# Patient Record
Sex: MALE | Race: WHITE | Employment: OTHER | URBAN - METROPOLITAN AREA
[De-identification: names, ages, dates, MRNs, and addresses within clinical notes are randomized per-mention and may not be internally consistent; named-entity substitution may affect disease eponyms.]

---

## 2017-04-04 PROBLEM — L03.011 PARONYCHIA, RIGHT: Status: ACTIVE | Noted: 2017-04-04

## 2017-09-26 PROBLEM — I45.10 INCOMPLETE RBBB: Status: ACTIVE | Noted: 2017-09-26

## 2017-09-26 PROCEDURE — 82607 VITAMIN B-12: CPT | Performed by: INTERNAL MEDICINE

## 2017-09-26 PROCEDURE — 82746 ASSAY OF FOLIC ACID SERUM: CPT | Performed by: INTERNAL MEDICINE

## 2017-10-03 PROBLEM — H40.1131 PRIMARY OPEN ANGLE GLAUCOMA OF BOTH EYES, MILD STAGE: Status: ACTIVE | Noted: 2017-10-03

## 2017-10-23 PROCEDURE — 82043 UR ALBUMIN QUANTITATIVE: CPT | Performed by: INTERNAL MEDICINE

## 2017-10-23 PROCEDURE — 82570 ASSAY OF URINE CREATININE: CPT | Performed by: INTERNAL MEDICINE

## 2017-10-23 PROCEDURE — 81003 URINALYSIS AUTO W/O SCOPE: CPT | Performed by: INTERNAL MEDICINE

## 2017-10-30 PROBLEM — L03.011 PARONYCHIA, RIGHT: Status: RESOLVED | Noted: 2017-04-04 | Resolved: 2017-10-30

## 2017-10-30 PROBLEM — K76.0 HEPATIC STEATOSIS: Status: ACTIVE | Noted: 2017-10-30

## 2017-10-30 PROBLEM — R35.0 BENIGN PROSTATIC HYPERPLASIA WITH URINARY FREQUENCY: Status: ACTIVE | Noted: 2017-10-30

## 2017-10-30 PROBLEM — N40.1 BENIGN PROSTATIC HYPERPLASIA WITH URINARY FREQUENCY: Status: ACTIVE | Noted: 2017-10-30

## 2018-01-06 PROBLEM — T14.8XXA WOUND DRAINAGE: Status: ACTIVE | Noted: 2018-01-06

## 2018-01-06 PROBLEM — L24.A9 WOUND DRAINAGE: Status: ACTIVE | Noted: 2018-01-06

## 2018-01-06 PROBLEM — Z98.890 S/P SHOULDER SURGERY: Status: ACTIVE | Noted: 2018-01-06

## 2018-05-02 PROBLEM — G89.4 CHRONIC PAIN SYNDROME: Status: ACTIVE | Noted: 2018-05-02

## 2018-06-20 PROBLEM — L24.A9 WOUND DRAINAGE: Status: RESOLVED | Noted: 2018-01-06 | Resolved: 2018-06-20

## 2018-06-20 PROBLEM — Z79.4 TYPE 2 DIABETES MELLITUS WITH HYPERGLYCEMIA, WITH LONG-TERM CURRENT USE OF INSULIN (HCC): Status: ACTIVE | Noted: 2018-06-20

## 2018-06-20 PROBLEM — E11.65 TYPE 2 DIABETES MELLITUS WITH HYPERGLYCEMIA, WITH LONG-TERM CURRENT USE OF INSULIN (HCC): Status: ACTIVE | Noted: 2018-06-20

## 2018-06-20 PROBLEM — T14.8XXA WOUND DRAINAGE: Status: RESOLVED | Noted: 2018-01-06 | Resolved: 2018-06-20

## 2018-06-20 PROBLEM — R74.8 ELEVATED LIVER ENZYMES: Status: ACTIVE | Noted: 2018-06-20

## 2018-06-20 PROBLEM — E66.01 MORBID OBESITY (HCC): Status: ACTIVE | Noted: 2018-06-20

## 2018-06-22 PROCEDURE — 36415 COLL VENOUS BLD VENIPUNCTURE: CPT | Performed by: INTERNAL MEDICINE

## 2018-06-22 PROCEDURE — 80074 ACUTE HEPATITIS PANEL: CPT | Performed by: INTERNAL MEDICINE

## 2018-07-25 PROBLEM — M54.50 CHRONIC MIDLINE LOW BACK PAIN WITHOUT SCIATICA: Status: ACTIVE | Noted: 2018-07-25

## 2018-07-25 PROBLEM — G89.29 CHRONIC MIDLINE LOW BACK PAIN WITHOUT SCIATICA: Status: ACTIVE | Noted: 2018-07-25

## 2018-08-21 PROBLEM — M75.101 ROTATOR CUFF SYNDROME OF BOTH SHOULDERS: Status: ACTIVE | Noted: 2018-08-21

## 2018-08-21 PROBLEM — M75.102 ROTATOR CUFF SYNDROME OF BOTH SHOULDERS: Status: ACTIVE | Noted: 2018-08-21

## 2018-09-06 PROBLEM — M75.121 COMPLETE TEAR OF RIGHT ROTATOR CUFF: Status: ACTIVE | Noted: 2018-09-06

## 2018-10-02 PROCEDURE — 82043 UR ALBUMIN QUANTITATIVE: CPT | Performed by: INTERNAL MEDICINE

## 2018-10-02 PROCEDURE — 82570 ASSAY OF URINE CREATININE: CPT | Performed by: INTERNAL MEDICINE

## 2018-10-23 ENCOUNTER — APPOINTMENT (OUTPATIENT)
Dept: LAB | Age: 58
End: 2018-10-23
Attending: ORTHOPAEDIC SURGERY
Payer: COMMERCIAL

## 2018-10-23 DIAGNOSIS — Z98.890 S/P ROTATOR CUFF REPAIR: ICD-10-CM

## 2018-10-23 PROCEDURE — 87205 SMEAR GRAM STAIN: CPT

## 2018-10-23 PROCEDURE — 87075 CULTR BACTERIA EXCEPT BLOOD: CPT

## 2018-10-23 PROCEDURE — 87070 CULTURE OTHR SPECIMN AEROBIC: CPT

## 2019-02-25 PROCEDURE — 82085 ASSAY OF ALDOLASE: CPT | Performed by: INTERNAL MEDICINE

## 2019-06-14 PROCEDURE — 83516 IMMUNOASSAY NONANTIBODY: CPT | Performed by: OTHER

## 2019-06-14 PROCEDURE — 86235 NUCLEAR ANTIGEN ANTIBODY: CPT | Performed by: OTHER

## 2019-06-14 PROCEDURE — 86141 C-REACTIVE PROTEIN HS: CPT | Performed by: OTHER

## 2019-06-14 PROCEDURE — 82085 ASSAY OF ALDOLASE: CPT | Performed by: OTHER

## 2019-12-04 PROCEDURE — 88305 TISSUE EXAM BY PATHOLOGIST: CPT | Performed by: INTERNAL MEDICINE

## 2020-09-22 PROBLEM — I48.0 PAF (PAROXYSMAL ATRIAL FIBRILLATION) (HCC): Status: ACTIVE | Noted: 2020-09-22

## 2020-09-22 PROBLEM — M51.369 DDD (DEGENERATIVE DISC DISEASE), LUMBAR: Status: ACTIVE | Noted: 2020-09-22

## 2020-09-22 PROBLEM — M51.36 DDD (DEGENERATIVE DISC DISEASE), LUMBAR: Status: ACTIVE | Noted: 2020-09-22

## 2020-12-18 PROBLEM — Z79.01 ANTICOAGULATED: Status: ACTIVE | Noted: 2020-12-18

## 2021-01-11 PROCEDURE — 88305 TISSUE EXAM BY PATHOLOGIST: CPT | Performed by: UROLOGY

## 2021-01-11 PROCEDURE — 88344 IMHCHEM/IMCYTCHM EA MLT ANTB: CPT | Performed by: UROLOGY

## 2021-03-26 PROBLEM — M48.061 SPINAL STENOSIS OF LUMBAR REGION, UNSPECIFIED WHETHER NEUROGENIC CLAUDICATION PRESENT: Status: ACTIVE | Noted: 2021-03-26

## 2021-03-26 PROBLEM — K58.2 IRRITABLE BOWEL SYNDROME WITH BOTH CONSTIPATION AND DIARRHEA: Status: ACTIVE | Noted: 2021-03-26

## 2021-09-15 PROBLEM — E78.1 HYPERTRIGLYCERIDEMIA: Status: ACTIVE | Noted: 2021-09-15

## 2021-09-15 PROBLEM — E66.9 OBESITY (BMI 35.0-39.9 WITHOUT COMORBIDITY): Status: ACTIVE | Noted: 2021-09-15

## 2021-09-15 PROBLEM — E88.810 METABOLIC SYNDROME: Status: ACTIVE | Noted: 2021-09-15

## 2021-09-15 PROBLEM — E88.81 METABOLIC SYNDROME: Status: ACTIVE | Noted: 2021-09-15

## 2021-09-15 PROBLEM — E78.6 LOW HDL (UNDER 40): Status: ACTIVE | Noted: 2021-09-15

## 2025-05-02 RX ORDER — TIRZEPATIDE 15 MG/.5ML
1 INJECTION, SOLUTION SUBCUTANEOUS WEEKLY
COMMUNITY

## 2025-05-02 RX ORDER — VERAPAMIL HYDROCHLORIDE 240 MG/1
240 CAPSULE, DELAYED RELEASE ORAL 2 TIMES DAILY
COMMUNITY

## 2025-05-14 ENCOUNTER — ANESTHESIA EVENT (OUTPATIENT)
Dept: ENDOSCOPY | Facility: HOSPITAL | Age: 65
End: 2025-05-14
Payer: MEDICARE

## 2025-05-14 NOTE — ANESTHESIA PREPROCEDURE EVALUATION
PRE-OP EVALUATION    Patient Name: Tomy Joseph    Admit Diagnosis: GASTRO ESOPHAGED REFLUX DISEASE WITHOUT ESOPHAGITIS    Pre-op Diagnosis: GASTRO ESOPHAGED REFLUX DISEASE WITHOUT ESOPHAGITIS    ESOPHAGOGASTRODUODENOSCOPY (EGD), COLONOSCOPY    Anesthesia Procedure: ESOPHAGOGASTRODUODENOSCOPY (EGD), COLONOSCOPY  .    Surgeons and Role:     * Jimenez Hodges MD - Primary    Pre-op vitals reviewed.  Temp: 99.1 °F (37.3 °C)  Pulse: 92  Resp: 16  BP: 112/72  SpO2: 94 %  Body mass index is 37.88 kg/m².    Current medications reviewed.  Hospital Medications:  Current Medications[1]    Outpatient Medications:   Prescriptions Prior to Admission[2]    Allergies: Adhesive tape, Dust, Ibuprofen, Mold, Naprosyn [naproxen], and Zocor [simvastatin]      Anesthesia Evaluation        Anesthetic Complications           GI/Hepatic/Renal      (+) GERD                           Cardiovascular  Comment: Echo 2021 with preserved EF, no sig valve dz              (+) obesity  (+) hypertension                  (+) dysrhythmias and atrial fibrillation                  Endo/Other      (+) diabetes  type 2, using insulin                         Pulmonary                    (+) sleep apnea       Neuro/Psych                              On /Mounjaro-last took 1 wk ago  S/p 3 level C spine fusion        Past Surgical History[3]  Social Hx on file[4]  History   Drug Use No     Available pre-op labs reviewed.               Airway      Mallampati: III  Mouth opening: 3 FB  TM distance: 4 - 6 cm  Neck ROM: limited Cardiovascular      Rhythm: regular  Rate: normal     Dental    Dentition appears grossly intact         Pulmonary      Breath sounds clear to auscultation bilaterally.               Other findings              ASA: 3   Plan: MAC  NPO status verified and patient meets guidelines.    Post-procedure pain management plan discussed with surgeon and patient.      Plan/risks discussed with: patient and spouse                Present on  Admission:  **None**             [1]    glucose (Dex4) 15 GM/59ML oral liquid 15 g  15 g Oral Q15 Min PRN    Or    glucose (Glutose) 40% oral gel 15 g  15 g Oral Q15 Min PRN    Or    glucose-vitamin C (Dex-4) chewable tab 4 tablet  4 tablet Oral Q15 Min PRN    Or    dextrose 50% injection 50 mL  50 mL Intravenous Q15 Min PRN    Or    glucose (Dex4) 15 GM/59ML oral liquid 30 g  30 g Oral Q15 Min PRN    Or    glucose (Glutose) 40% oral gel 30 g  30 g Oral Q15 Min PRN    Or    glucose-vitamin C (Dex-4) chewable tab 8 tablet  8 tablet Oral Q15 Min PRN    lactated ringers infusion   Intravenous Continuous    ceFAZolin Sodium (ANCEF) injection 2 g  2 g Intravenous Once   [2]   Medications Prior to Admission   Medication Sig Dispense Refill Last Dose/Taking    Tirzepatide (MOUNJARO) 15 MG/0.5ML Subcutaneous Solution Auto-injector Inject 1 Dose into the skin once a week.   Past Month    apixaban 5 MG Oral Tab Take 1 tablet (5 mg total) by mouth 2 (two) times daily.   5/11/2025    Verapamil HCl  MG Oral Capsule SR 24 Hr Take 240 mg by mouth in the morning and 240 mg before bedtime.   5/14/2025    Cyanocobalamin (VITAMIN B 12 OR) Take 1 tablet by mouth daily.   Past Week    Icosapent Ethyl (VASCEPA) 1 g Oral Cap Take 2 g by mouth 2 (two) times daily. 360 capsule 3 Past Week    metoprolol succinate ER (TOPROL XL) 100 MG Oral Tablet 24 Hr Take 1 tablet (100 mg total) by mouth 2 (two) times a day. (Patient taking differently: Take 1 tablet (100 mg total) by mouth in the morning, at noon, and at bedtime.) 180 tablet 3 5/15/2025    FENOFIBRATE 145 MG Oral Tab TAKE 1 TABLET DAILY 90 tablet 3 5/14/2025    ROSUVASTATIN 40 MG Oral Tab TAKE 1 TABLET NIGHTLY 90 tablet 2 5/14/2025    tamsulosin (FLOMAX) cap Take 2 capsules (0.8 mg total) by mouth daily. 180 capsule 3 5/14/2025    albuterol 108 (90 Base) MCG/ACT Inhalation Aero Soln Inhale 2 puffs into the lungs every 4 (four) hours as needed. 3 each 3 Past Month    latanoprost  0.005 % Ophthalmic Solution Place 1 drop into both eyes nightly. 10 mL 3 5/14/2025    diazepam 5 MG Oral Tab TAKE 1 TABLET BY MOUTH TWICE DAILY AS NEEDED FOR BACK OR SPASMS 30 tablet 0 Past Month    ascorbic acid 1000 MG Oral Tab Take 1 tablet (1,000 mg total) by mouth daily as needed.   Past Week    TRESIBA FLEXTOUCH 200 UNIT/ML Subcutaneous Solution Pen-injector in the morning and before bedtime.   5/14/2025    lidocaine 5 % External Patch Place 1 patch onto the skin as needed.   Past Month    Zinc 50 MG Oral Tab    Past Week    pregabalin 150 MG Oral Cap Take 1 capsule (150 mg total) by mouth 3 (three) times daily. 270 capsule 3 5/14/2025    HYDROcodone-acetaminophen  MG Oral Tab Take 1 tablet by mouth 2 (two) times daily as needed for Pain. 60 tablet 0 Past Month    Coenzyme Q10 (CO Q-10) 300 MG Oral Cap Take 1 capsule by mouth in the morning.   Taking    vitamin E 400 UNITS Oral Cap Take 1,000 Units by mouth every other day.   Past Week    Insulin Lispro 100 UNIT/ML Subcutaneous Solution Pen-injector Inject into the skin. Sliding scale   Past Month    Ferrous Sulfate 325 (65 FE) MG Oral Tab Take 1 tablet (325 mg total) by mouth daily with breakfast.   Past Week    B Complex Vitamins (B COMPLEX OR) Take 1 tablet by mouth daily.   Past Week    MetFORMIN HCl 1000 MG Oral Tab Take 1 Tab by mouth 2 (two) times daily with meals. 180 Tab 1 5/14/2025    Cholecalciferol (VITAMIN D3) 5000 UNITS Oral Cap Take 1 capsule (5,000 Units total) by mouth in the morning.   Past Week    FREESTYLE LITE    5/15/2025    DAILY MULTIVITAMIN OR    Past Week    Dulaglutide (TRULICITY) 3 MG/0.5ML Subcutaneous Solution Pen-injector Inject 3 mg into the skin once a week. 13 pens, 3 month supply 13 each 3 More than a month    Tadalafil 20 MG Oral Tab Take 1 tablet (20 mg total) by mouth daily as needed for Erectile Dysfunction. (Patient not taking: Reported on 4/6/2022) 30 tablet 3 More than a month    Alclometasone Dipropionate 0.05 %  External Cream Apply to groin BID as needed for flares 45 g 0 More than a month   [3]   Past Surgical History:  Procedure Laterality Date    Back surgery  08    L3,4,5 fusion    Back surgery      C4,5,6 fusion    Colonoscopy N/A 2019    Procedure: COLONOSCOPY, POSSIBLE BIOPSY, POSSIBLE POLYPECTOMY 26919;  Surgeon: Jimenez Ballard MD;  Location: [facility]    Colonoscopy,biopsy N/A 2015    Procedure: COLONOSCOPY, POSSIBLE BIOPSY, POSSIBLE POLYPECTOMY 76945;  Surgeon: Jimenez Ballard MD;  Location: [facility]    Colonoscopy,remv lesn,snare  2010    2 polyps (1 adenoma), int hemorrhoids, fair prep; next colonoscopy in 5 yrs; Performed by JIMENEZ BALLARD at [facility]    Hemorrhoidectomy      Other surgical history      knee arthroscopy    Other surgical history  12  [facility]    EGD (anemia): mild gastritis (bx neg for H pylori); duod bx normal; all future endoscopic procedures should be with MAC    Other surgical history  12    flow u/s cysto- Dr. Gomez    Other surgical history      Rt CTR and Rt cubital tunnel release    Other surgical history      Lt ring finger fx repair    Other surgical history      b/l knee arthroscopy    Other surgical history  2021    PnBx- Dr. Ospina     Other surgical history  2022    Transperineal biopsy Dr. Shoemaker    Tonsillectomy      Vasectomy     [4]   Social History  Socioeconomic History    Marital status:     Number of children: 2   Occupational History    Occupation: on disability since    Tobacco Use    Smoking status: Former     Current packs/day: 0.00     Average packs/day: 3.0 packs/day for 25.0 years (75.0 ttl pk-yrs)     Types: Cigarettes     Start date: 1973     Quit date: 1998     Years since quittin.3    Smokeless tobacco: Never   Vaping Use    Vaping status: Never Used   Substance and Sexual Activity    Alcohol use: Yes     Comment: occ    Drug use: No

## 2025-05-15 ENCOUNTER — HOSPITAL ENCOUNTER (OUTPATIENT)
Facility: HOSPITAL | Age: 65
Setting detail: HOSPITAL OUTPATIENT SURGERY
Discharge: HOME | End: 2025-05-15
Attending: INTERNAL MEDICINE | Admitting: INTERNAL MEDICINE
Payer: MEDICARE

## 2025-05-15 ENCOUNTER — ANESTHESIA (OUTPATIENT)
Dept: ENDOSCOPY | Facility: HOSPITAL | Age: 65
End: 2025-05-15
Payer: MEDICARE

## 2025-05-15 VITALS
TEMPERATURE: 99 F | BODY MASS INDEX: 37.86 KG/M2 | HEIGHT: 74 IN | RESPIRATION RATE: 14 BRPM | DIASTOLIC BLOOD PRESSURE: 62 MMHG | SYSTOLIC BLOOD PRESSURE: 102 MMHG | WEIGHT: 295 LBS | HEART RATE: 90 BPM | OXYGEN SATURATION: 95 %

## 2025-05-15 LAB — GLUCOSE BLD-MCNC: 112 MG/DL (ref 70–99)

## 2025-05-15 PROCEDURE — 88305 TISSUE EXAM BY PATHOLOGIST: CPT | Performed by: INTERNAL MEDICINE

## 2025-05-15 PROCEDURE — 82962 GLUCOSE BLOOD TEST: CPT

## 2025-05-15 RX ORDER — DEXTROSE MONOHYDRATE 25 G/50ML
50 INJECTION, SOLUTION INTRAVENOUS
Status: DISCONTINUED | OUTPATIENT
Start: 2025-05-15 | End: 2025-05-15

## 2025-05-15 RX ORDER — LIDOCAINE HYDROCHLORIDE 10 MG/ML
INJECTION, SOLUTION EPIDURAL; INFILTRATION; INTRACAUDAL; PERINEURAL AS NEEDED
Status: DISCONTINUED | OUTPATIENT
Start: 2025-05-15 | End: 2025-05-15 | Stop reason: SURG

## 2025-05-15 RX ORDER — NICOTINE POLACRILEX 4 MG
15 LOZENGE BUCCAL
Status: DISCONTINUED | OUTPATIENT
Start: 2025-05-15 | End: 2025-05-15

## 2025-05-15 RX ORDER — SODIUM CHLORIDE, SODIUM LACTATE, POTASSIUM CHLORIDE, CALCIUM CHLORIDE 600; 310; 30; 20 MG/100ML; MG/100ML; MG/100ML; MG/100ML
INJECTION, SOLUTION INTRAVENOUS CONTINUOUS
Status: DISCONTINUED | OUTPATIENT
Start: 2025-05-15 | End: 2025-05-15

## 2025-05-15 RX ORDER — NICOTINE POLACRILEX 4 MG
30 LOZENGE BUCCAL
Status: DISCONTINUED | OUTPATIENT
Start: 2025-05-15 | End: 2025-05-15

## 2025-05-15 RX ADMIN — LIDOCAINE HYDROCHLORIDE 25 MG: 10 INJECTION, SOLUTION EPIDURAL; INFILTRATION; INTRACAUDAL; PERINEURAL at 13:49:00

## 2025-05-15 RX ADMIN — SODIUM CHLORIDE, SODIUM LACTATE, POTASSIUM CHLORIDE, CALCIUM CHLORIDE: 600; 310; 30; 20 INJECTION, SOLUTION INTRAVENOUS at 13:46:00

## 2025-05-15 NOTE — DISCHARGE INSTRUCTIONS
FINDINGS:  1.  The upper GI exam was normal.  2.  In the colon, 4 polyps were found and removed.  3.  There were small pockets in the colon called diverticulosis.  These are considered normal to have.  So long as you have not had any complications with these pockets such as significant rectal bleeding or infections called diverticulitis, then there is nothing recommended that you need to do.      PLAN:  1.  Await the biopsy results.      If you have any questions, please call us at [phone].    Thank you,  Jimenez Hodges MD    Home Care Instructions for Colonoscopy and/or Gastroscopy with Sedation    Diet:  - Resume your regular diet as tolerated.  - Start with light meals to minimize bloating.  - Do not drink alcohol today.    Medication:  - If you have questions about resuming your normal medications, please contact your Primary Care Physician.    Activities:  - Take it easy today. Do not return to work today.  - Do not drive today.  - Do not operate any machinery today (including kitchen equipment).    Colonoscopy:  - You may notice some rectal \"spotting\" (a little blood on the toilet tissue) for a day or two after the exam. This is normal.  - If you experience any rectal bleeding (not spotting), persistent tenderness or sharp severe abdominal pains, oral temperature over 100 degrees Fahrenheit, light-headedness or dizziness, or any other problems, contact your doctor.    Gastroscopy:  - You may have a sore throat for 2-3 days following the exam. This is normal. Gargling with warm salt water (1/2 tsp salt to 1 glass warm water) or using throat lozenges will help.  - If you experience any sharp pain in your neck, abdomen or chest, vomiting of blood, oral temperature over 100 degrees Fahrenheit, light-headedness or dizziness, or any other problems, contact your doctor.    **If unable to reach your doctor, please go to the [facility] Emergency Room**    - Your referring physician will receive a full report of  your examination.  - If you do not hear from your doctor's office within two weeks of your biopsy, please call them for your results.

## 2025-05-15 NOTE — ANESTHESIA POSTPROCEDURE EVALUATION
[facility]    Tomy Joseph Patient Status:  Hospital Outpatient Surgery   Age/Gender 65 year old male MRN [id number]   Location [facility] ENDOSCOPY [facility] Attending Jimenez Hodges MD   Hosp Day # 0 PCP Shawn Peña MD       Anesthesia Post-op Note    ESOPHAGOGASTRODUODENOSCOPY (EGD), COLONOSCOPY WITH COLD SNARE AND FORCEP POLYPECTOMY    Procedure Summary       Date: 05/15/25 Room / Location:  ENDOSCOPY 03 / [facility]    Anesthesia Start: 1345 Anesthesia Stop: 1419    Procedures:       ESOPHAGOGASTRODUODENOSCOPY (EGD), COLONOSCOPY WITH COLD SNARE AND FORCEP POLYPECTOMY      . Diagnosis: (EGD: NORMAL COLON: POLYPS, DIVERTICULOSIS. HEMORRHOIDS)    Surgeons: Jimenez Hodges MD Anesthesiologist: Dilan Gant MD    Anesthesia Type: MAC ASA Status: 3            Anesthesia Type: MAC    Vitals Value Taken Time   /66 05/15/25 14:20   Temp  05/15/25 14:20   Pulse 91 05/15/25 14:20   Resp 14 05/15/25 14:19   SpO2 95 % 05/15/25 14:20   Vitals shown include unfiled device data.        Patient Location: Endoscopy    Anesthesia Type: MAC    Airway Patency: patent    Postop Pain Control: adequate    Mental Status: mildly sedated but able to meaningfully participate in the post-anesthesia evaluation    Nausea/Vomiting: none    Cardiopulmonary/Hydration status: stable euvolemic    Complications: no apparent anesthesia related complications    Postop vital signs: stable    Dental Exam: Unchanged from Preop    Patient to be discharged from PACU when criteria met.

## 2025-05-15 NOTE — H&P
History and Physical for Endoscopic Procedure      Tomy Joseph Patient Status:  Hospital Outpatient Surgery    1960 MRN [id number]   [facility] ENDOSCOPY PAIN CENTER Attending Jimenez Hodges MD   Hosp Day # 0 PCP Shawn Peña MD     Date of Consult:  5/15/25    Reason for Consultation:  GERD  personal history of colon polyps      History of Present Illness:  Tomy Joseph is a a(n) 65 year old male.     History:  Past Medical History[1]  Past Surgical History[2]  Family History[3]   reports that he quit smoking about 27 years ago. His smoking use included cigarettes. He started smoking about 52 years ago. He has a 75 pack-year smoking history. He has never used smokeless tobacco. He reports current alcohol use. He reports that he does not use drugs.    Allergies:  Allergies[4]    Medications:  Current Hospital Medications[5]    Review of Systems:  Gastrointestinal: negative other than specified in the HPI  General: negative other than specified in the HPI  Neurological: negative other than specified in the HPI  Cardiovascular: negative other than specified in the HPI  Respiratory: negative other than specified in the HPI    Physical Exam:  No acute distress  RRR  CTA B/L  SOFT +BS    Assessment/Plan:  Problem List[6]    EGD/colonoscopy today.    Jimenez Hodges MD  5/15/2025  1:11 PM         [1]   Past Medical History:   ALLERGIC RHINITIS    Arrhythmia    a-fib    BACK PAIN    Back problem    cervial and lumbar fusions    Cancer (HCC)    prostate cancer     DIABETES    Elevated PSA    GERD    High blood pressure    High cholesterol    HYPERLIPIDEMIA    HYPERTENSION    Hypertrophy of prostate with urinary obstruction and other lower urinary tract symptoms (LUTS)    Hypogonadism male    IMPOTENCE    OTHER DISEASES    occular htn    OTHER DISEASES    diabetic dermopathy    SLEEP APNEA    PSG at [facility] 5/227/10 AHI 40    Sleep apnea    cpap    Stomach ulcer    Type II or unspecified  type diabetes mellitus without mention of complication, not stated as uncontrolled    Visual impairment    reading glasses   [2]   Past Surgical History:  Procedure Laterality Date    Back surgery  7/30/08    L3,4,5 fusion    Back surgery  2007    C4,5,6 fusion    Colonoscopy N/A 12/4/2019    Procedure: COLONOSCOPY, POSSIBLE BIOPSY, POSSIBLE POLYPECTOMY 89496;  Surgeon: Jimenez Ballard MD;  Location: [facility]    Colonoscopy,biopsy N/A 12/28/2015    Procedure: COLONOSCOPY, POSSIBLE BIOPSY, POSSIBLE POLYPECTOMY 78737;  Surgeon: Jimenez Ballard MD;  Location: [facility]    Colonoscopy,remv lesn,snare  11/18/2010    2 polyps (1 adenoma), int hemorrhoids, fair prep; next colonoscopy in 5 yrs; Performed by JIMENEZ BALLARD at [facility]    Hemorrhoidectomy      Other surgical history      knee arthroscopy    Other surgical history  5/31/12  [facility]    EGD (anemia): mild gastritis (bx neg for H pylori); duod bx normal; all future endoscopic procedures should be with MAC    Other surgical history  9/7/12    flow u/s cysto- Dr. Gomez    Other surgical history      Rt CTR and Rt cubital tunnel release    Other surgical history      Lt ring finger fx repair    Other surgical history      b/l knee arthroscopy    Other surgical history  01/11/2021    PnBx- Dr. Ospina     Other surgical history  02/03/2022    Transperineal biopsy Dr. Shoemaker    Tonsillectomy      Vasectomy     [3]   Family History  Problem Relation Age of Onset    Diabetes Father     Heart Disorder Father     Diabetes Mother     Heart Disorder Mother     Gastro-Intestinal Disorder Son         Crohn's disease    Cancer Neg    [4]   Allergies  Allergen Reactions    Adhesive Tape RASH     Contact dermatitis Plastic bandages      Dust     Ibuprofen      Stomach ulcers    Mold     Naprosyn [Naproxen]      Ulcers with naprosyn    Zocor [Simvastatin]      Pharmacy advisement   [5]   Current Facility-Administered Medications:     glucose (Dex4) 15  GM/59ML oral liquid 15 g, 15 g, Oral, Q15 Min PRN **OR** glucose (Glutose) 40% oral gel 15 g, 15 g, Oral, Q15 Min PRN **OR** glucose-vitamin C (Dex-4) chewable tab 4 tablet, 4 tablet, Oral, Q15 Min PRN **OR** dextrose 50% injection 50 mL, 50 mL, Intravenous, Q15 Min PRN **OR** glucose (Dex4) 15 GM/59ML oral liquid 30 g, 30 g, Oral, Q15 Min PRN **OR** glucose (Glutose) 40% oral gel 30 g, 30 g, Oral, Q15 Min PRN **OR** glucose-vitamin C (Dex-4) chewable tab 8 tablet, 8 tablet, Oral, Q15 Min PRN    lactated ringers infusion, , Intravenous, Continuous    Facility-Administered Medications Ordered in Other Encounters:     ceFAZolin Sodium (ANCEF) injection 2 g, 2 g, Intravenous, Once  [6]   Patient Active Problem List  Diagnosis    Mixed hyperlipidemia    Essential hypertension, benign    GERD (gastroesophageal reflux disease)    ED (erectile dysfunction)    S/P carpal tunnel release    Senile nuclear sclerosis    Hypogonadism in male    Rotator cuff syndrome of right shoulder    Diabetes mellitus type 2 in obese    KIRSTIN on CPAP    Numbness and tingling in left hand    Carpal tunnel syndrome of left wrist  Global 6/29/2016    Trigger finger, left middle finger    Right long and ring trigger fingers release A-1 pulley  Global 05/12/2020    Diabetes mellitus type 2 without retinopathy (HCC)    Drusen (degenerative) of retina, bilateral    Benign prostatic hyperplasia with urinary obstruction    Left shoulder pain, unspecified chronicity    Cubital tunnel syndrome, left    Adhesive capsulitis of left shoulder  Global 03/20/2018    Incomplete RBBB    Primary open angle glaucoma of both eyes, mild stage    Benign prostatic hyperplasia with urinary frequency    Hepatic steatosis    S/P shoulder surgery    Chronic pain syndrome    Type 2 diabetes mellitus with hyperglycemia, with long-term current use of insulin (HCC)    Morbid obesity (HCC)    Elevated liver enzymes    Chronic midline low back pain without sciatica    Rotator  cuff syndrome of both shoulders    Complete tear of right rotator cuff  Global 12/25/2018    PAF (paroxysmal atrial fibrillation) (HCC)    DDD (degenerative disc disease), lumbar    Anticoagulated on xarelto    Irritable bowel syndrome with both constipation and diarrhea    Spinal stenosis of lumbar region, unspecified whether neurogenic claudication present    Obesity (BMI 35.0-39.9 without comorbidity)    Hypertriglyceridemia    Low HDL (under 40)    Metabolic syndrome

## 2025-05-15 NOTE — OPERATIVE REPORT
EGD/Colonoscopy Operative Report    Tomy Joseph Patient Status:  Hospital Outpatient Surgery    1960 MRN [id number]   [facility] ENDOSCOPY [facility] Attending Jimenez Hodges MD   Hosp Day #   0 PCP Shawn Peña MD     Pre-Operative Diagnosis: GASTRO ESOPHAGED REFLUX DISEASE WITHOUT ESOPHAGITIS   personal history of colon polyps      Post-Operative Diagnosis:    ESOPHAGUS:  normal.   STOMACH:  normal.   DUODENUM:  normal.   COLON:      1.  Diminutive transverse colon polyp removed with a cold biopsy.    2.  2 splenic flexure polyps.  One was 5 mm sessile removed with a cold snare.  The other was diminutive removed with a cold biopsy.    3.  7 mm sessile sigmoid polyp removed with a cold snare.    4.  Sigmoid diverticulosis.    5.  Hemorrhoids      Procedure Performed:   EGD   COLONOSCOPY with snare polypectomy and biopsy  Informed Consent: Informed consent for both the procedure and sedation were obtained from the patient. The potentially life-threatening complications of sedation, bleeding,  perforation, transfusion or repeat endoscopy were reviewed along with the possible need for hospitalization, surgical management, transfusion or repeat endoscopy should one of these complications arise. The patient understands and is agreeable to proceed.  Sedation Type: MAC-Patient received sedation with monitored anesthesia provided by an anesthesiologist    Moderate Sedation Time: None.  Deep sedation provided by anesthesia.    Cecum Withdrawal Time:  15 min    Date of previous colonoscopy:     Procedure Description: The patient was placed in the left lateral decubitus position. A bite block was placed in the patient’s mouth. The endoscope was inserted through the mouth and advanced under direct visualization to the 3rd portion of the duodenum and  was then withdrawn to examine the duodenal bulb and gastric antrum.  The endoscope was then retroflexed to examine the angulus, GE junction, cardia, body and fundus,  then withdrawn to examine the esophagus. The endoscope was then removed from the patient. The patient tolerated the procedure well with no immediate complications. The patient was then repositioned for colonoscopy.  After careful digital rectal examination, the Adult colonoscope was inserted into the rectum and advanced to the level of the cecum under direct visualization. The cecum was identified by landmarks, including the appendiceal orifice and ileoceccal valve. Careful examination of the entire colon was performed during withdrawal of the endoscope. The scope was withdrawn to the rectum and retroflexion was performed.  The patient tolerated the procedure well with no immediate complications. The patient was transferred to the recovery area in stable condition.   Quality of Preparation: Adequate  Aronchick Bowel Prep Scale:  2 - good    Findings:    ESOPHAGUS:  normal.   STOMACH:  normal.   DUODENUM:  normal.   COLON:      1.  Diminutive transverse colon polyp removed with a cold biopsy.    2.  2 splenic flexure polyps.  One was 5 mm sessile removed with a cold snare.  The other was diminutive removed with a cold biopsy.    3.  7 mm sessile sigmoid polyp removed with a cold snare.    4.  Sigmoid diverticulosis.    5.  Hemorrhoids      Recommendations:    Await pathology results.  Further recommendations with regards to when the repeat the colonoscopy will be based on these results.    Discharge:  The patient was given an after visit summary detailing the procedure, findings, recommendations and follow up plans.  Jimenez Hodges MD  5/15/2025  1:11 PM

## (undated) DEVICE — GIJAW SINGLE-USE BIOPSY FORCEPS WITH NEEDLE: Brand: GIJAW

## (undated) DEVICE — KIT CUSTOM ENDOPROCEDURE STERIS

## (undated) DEVICE — KIT VLV 5 PC AIR H2O SUCT BX ENDOGATOR CONN

## (undated) DEVICE — LASSO POLYPECTOMY SNARE: Brand: LASSO

## (undated) DEVICE — 3M™ RED DOT™ MONITORING ELECTRODE WITH FOAM TAPE AND STICKY GEL, 50/BAG, 20/CASE, 72/PLT 2570: Brand: RED DOT™

## (undated) DEVICE — 10FT COMBINED O2 DELIVERY/CO2 MONITORING. FILTER WITH MICROSTREAM TYPE LUER: Brand: DUAL ADULT NASAL CANNULA

## (undated) DEVICE — V2 SPECIMEN COLLECTION MANIFOLD KIT: Brand: NEPTUNE

## (undated) DEVICE — 1200CC GUARDIAN II: Brand: GUARDIAN

## (undated) DEVICE — BITEBLOCK ENDOSCP 60FR MAXI STRP